# Patient Record
Sex: FEMALE | Race: OTHER | NOT HISPANIC OR LATINO | URBAN - METROPOLITAN AREA
[De-identification: names, ages, dates, MRNs, and addresses within clinical notes are randomized per-mention and may not be internally consistent; named-entity substitution may affect disease eponyms.]

---

## 2023-12-06 ENCOUNTER — OFFICE VISIT (OUTPATIENT)
Dept: PRIMARY CARE | Facility: CLINIC | Age: 33
End: 2023-12-06
Payer: COMMERCIAL

## 2023-12-06 VITALS
HEIGHT: 65 IN | RESPIRATION RATE: 15 BRPM | TEMPERATURE: 98.3 F | WEIGHT: 190 LBS | HEART RATE: 79 BPM | BODY MASS INDEX: 31.65 KG/M2 | OXYGEN SATURATION: 99 %

## 2023-12-06 DIAGNOSIS — F41.9 ANXIETY: ICD-10-CM

## 2023-12-06 DIAGNOSIS — K50.90 CROHN'S DISEASE WITHOUT COMPLICATION, UNSPECIFIED GASTROINTESTINAL TRACT LOCATION (CMS/HCC): ICD-10-CM

## 2023-12-06 DIAGNOSIS — Z00.00 ROUTINE GENERAL MEDICAL EXAMINATION AT A HEALTH CARE FACILITY: Primary | ICD-10-CM

## 2023-12-06 DIAGNOSIS — Z13.220 LIPID SCREENING: ICD-10-CM

## 2023-12-06 DIAGNOSIS — Z12.4 CERVICAL CANCER SCREENING: ICD-10-CM

## 2023-12-06 PROCEDURE — 3008F BODY MASS INDEX DOCD: CPT | Performed by: STUDENT IN AN ORGANIZED HEALTH CARE EDUCATION/TRAINING PROGRAM

## 2023-12-06 PROCEDURE — 99385 PREV VISIT NEW AGE 18-39: CPT | Performed by: STUDENT IN AN ORGANIZED HEALTH CARE EDUCATION/TRAINING PROGRAM

## 2023-12-06 RX ORDER — NEOMYCIN SULFATE 500 MG/1
TABLET ORAL
COMMUNITY
Start: 2023-11-16 | End: 2024-07-22 | Stop reason: ALTCHOICE

## 2023-12-06 RX ORDER — SERTRALINE HYDROCHLORIDE 100 MG/1
100 TABLET, FILM COATED ORAL DAILY
Qty: 90 TABLET | Refills: 3 | Status: SHIPPED | OUTPATIENT
Start: 2023-12-06

## 2023-12-06 RX ORDER — MESALAMINE 1000 MG/1
1000 SUPPOSITORY RECTAL
COMMUNITY
End: 2024-07-22 | Stop reason: ALTCHOICE

## 2023-12-06 RX ORDER — SERTRALINE HYDROCHLORIDE 100 MG/1
100 TABLET, FILM COATED ORAL DAILY
COMMUNITY
End: 2023-12-06 | Stop reason: SDUPTHER

## 2023-12-06 RX ORDER — ERYTHROMYCIN 500 MG/1
TABLET, COATED ORAL SEE ADMIN INSTRUCTIONS
COMMUNITY
Start: 2023-11-08 | End: 2024-07-22 | Stop reason: ALTCHOICE

## 2023-12-06 RX ORDER — MESALAMINE 400 MG/1
400 CAPSULE, DELAYED RELEASE ORAL DAILY
COMMUNITY
Start: 2023-09-06

## 2023-12-06 ASSESSMENT — PAIN SCALES - GENERAL: PAINLEVEL: 5

## 2023-12-06 ASSESSMENT — PATIENT HEALTH QUESTIONNAIRE - PHQ9
SUM OF ALL RESPONSES TO PHQ9 QUESTIONS 1 & 2: 3
SUM OF ALL RESPONSES TO PHQ QUESTIONS 1-9: 9

## 2023-12-06 NOTE — PROGRESS NOTES
[facility]     CHIEF COMPLAINT   New Patient  Previous PCP: Dr. Vanessa Chow, [facility], last seen >1yr ago     HISTORY OF PRESENT ILLNESS      This is a 33 y.o. female who presents to establish care.  Denies acute concerns today.    PAST MEDICAL AND SURGICAL HISTORY      Past Medical History:   Diagnosis Date   • Anxiety    • Crohn disease (CMS/HCC)        History reviewed. No pertinent surgical history.    MEDICATIONS        Current Outpatient Medications:   •  erythromycin base (E-MYCIN) 500 mg tablet, Take by mouth See admin instr., Disp: , Rfl:   •  mesalamine (CANASA) 1,000 mg suppository, Insert 1,000 mg into the rectum., Disp: , Rfl:   •  mesalamine (DELZICOL) 400 mg DR tablet, Take 400 mg by mouth daily., Disp: , Rfl:   •  neomycin (MYCIFRADIN) 500 mg tablet, TAKE 2 TABLETS (1,000 MG TOTAL) BY MOUTH EVERY 2 HOURS FOR 3 DOSES TAKE AS DIRECTED, Disp: , Rfl:   •  sertraline (ZOLOFT) 100 mg tablet, Take 1 tablet (100 mg total) by mouth daily., Disp: 90 tablet, Rfl: 3    ALLERGIES      Infliximab; Gadolinium-containing contrast media; Banana creme flavor; Kiwi; Latex, natural rubber; Pineapple; and Raspberry    FAMILY HISTORY      Family History   Problem Relation Age of Onset   • Heart attack Biological Father    • Hypertension Biological Father    • Cancer Paternal Grandmother        SOCIAL HISTORY      Social History     Socioeconomic History   • Marital status: Single     Spouse name: None   • Number of children: None   • Years of education: None   • Highest education level: None   Occupational History   • Occupation:    Tobacco Use   • Smoking status: Never     Passive exposure: Never   • Smokeless tobacco: Never   Vaping Use   • Vaping Use: Never used   Substance and Sexual Activity   • Alcohol use: Yes     Comment: Social   • Drug use: Never   • Sexual activity: Not Currently       REVIEW OF SYSTEMS      Review of Systems    PHYSICAL EXAMINATION      Visit  "Vitals  Pulse 79   Temp 36.8 °C (98.3 °F) (Temporal)   Resp 15   Ht 1.651 m (5' 5\")   Wt 86.2 kg (190 lb) Comment: with slippers on   SpO2 99%   BMI 31.62 kg/m²     Body mass index is 31.62 kg/m².    Physical Exam    LABS / IMAGING / STUDIES        Labs    No results found for: \"CREATININE\"  No results found for: \"WBC\", \"HGB\", \"HCT\", \"MCV\", \"PLT\"      No results found for: \"HGBA1C\"  No results found for: \"MICROALBUR\", \"BNQQ85INV\"        HEALTH MAINTENANCE           PAP Smear: Due, referred  Colonoscopy: 10/2023, next due ~2yr    Tdap: Believes UTD  Flu: Refuses AMA  COVID-19: Counseled    Labs: Due for lipid, ordered    Dentist: Overdue    Diet: Generally balanced  Exercise: Intermittent, walking dog       ASSESSMENT AND PLAN   Problem List Items Addressed This Visit        Immune    Crohn disease (CMS/HCC)     Stable, following regularly with GI.  Recommend ongoing GI evaluation and regular colonoscopic evaluation.            Mental Health    Anxiety     This condition is stable on current therapy.  Patient denies any symptoms or concerns associated with this condition.  Patient can continue current medical management and continue to follow with the appropriate specialist physician, if applicable.  Will continue to address and monitor at following appointments.           Relevant Medications    sertraline (ZOLOFT) 100 mg tablet       Other    Routine general medical examination at a health care facility - Primary     Patient is up-to-date with age-appropriate cancer screenings and vaccinations unless otherwise noted above.  Counseled regarding importance of keeping up-to-date with vaccinations and cancer screenings as applicable.  Baseline/repeat/annual labs ordered.  RTO 1 year or sooner PRN.          Other Visit Diagnoses     Cervical cancer screening        Relevant Orders    Ambulatory referral to Gynecology    Lipid screening        Relevant Orders    Lipid panel          Health Care Maintenance (discussed if " applicable):    Patient encouraged to increase activity gradually as tolerated with a goal of 150 minutes of aerobic activity per week via AVS.     Counseled patient on healthy eating (high quality, low carb, low sugar diet) via AVS.     Advised patient to complete regular dental examinations, brushing and flossing daily via AVS.    Encouraged enhanced safety by wearing seat belts, checking smoke and CO detectors via AVS.            Valente Sr, DO  12/06/23        Some or all of this note has been written using voice recognition software.    Please excuse any typographical errors.    On date of encounter, 30 minutes were spent exclusively dedicated to this encounter including pre-visit chart review and documentation, patient interview and physical examination, and post-visit documentation.

## 2023-12-06 NOTE — PATIENT INSTRUCTIONS
After-Visit Instructions and Tips for General Wellbeing      -Let us know over our Patient Portal ([employer]) if you have any further questions or concerns.         At your visit today, we determined the time for you to come back for your next visit.  This was based off of your medical history, any medical problems/symptoms discussed at time of visit, and any testing ordered.         If at any point between now and your next visit you to have any questions or concerns, please feel free to reach out to me via our Patient Portal, [employer].  Green Plug is free to use and I typically get back to you same-day, Monday through Friday.  Sometimes we are able to handle small questions or concerns over Green Plug, however if we cannot safely do so I will recommend that you come in for an appointment or get set up with a telemedicine appointment.      -Don't forget to get your blood work done.         Blood work is typically performed either through [facility], [facility], or [facility] laboratories.  This is dependent on your insurance, and during your visit we have discussed which lab is covered for you.  It will additionally state what lab you can go to on the hard copy of the lab slip that was provided to you after your visit.            Unless stated otherwise, these labs are FASTING.  You should not have anything to eat or drink besides water or black coffee (no cream or sugar) for at least 8 hours prior to getting your blood drawn.  Because of this, I usually recommend getting your blood work done first thing in the morning, as you have been fasting overnight.  Many people have the same idea however and these labs can get quite busy in the morning.         I recommend going online and setting up an appointment with the lab for an early morning just so that specific time spot is guaranteed for you and you are not waiting for an excess amount of time.  Make sure to bring the hard copy of your lab slip with you just in case they  cannot find you in their computer system.      As soon as I get all of your blood work results back, I will message you over [employer] or call you if the lab results are more pressing.      -Don't forget to get your Preventive Care screenings done.         If you were given any prescriptions for preventive care testing (such as mammograms, colonoscopies, Pap smears, or bone density testing), do not forget to get these done as well.  At your visit we discussed how to go about scheduling these, however if you still have questions you can reach out to me via [employer].         Additionally, all patients should be seen by their dentist for a routine dental cleaning at least once every 6 months.  Sometimes, dentists will bring you back sooner if they have to do more intensive cleaning.  Outside of the dentist, you should make sure you are brushing your teeth and flossing regularly.      -Dietary Tips         Every patient has individual dietary needs, however in general I recommend being cognizant of and limiting your intake of the following for food groups:    Salt/Sodium:       Salt can raise your blood pressure, which in turn can lead to increased risk of developing heart disease or strokes.  Please make sure you limit your use of sodium in cooking, and if you are buying a premade food item that has nutrition facts on the box/bag, please check for sodium content.  Many snacks such as chips or pretzels have a large amount of sodium in them.  Please do not exceed the daily recommended value of your sodium intake.    Sugars and Carbs:       Sugars (such as found in cake, candy, cookies, ice cream, etc.) and carbohydrates (such as found in bread, rice, pasta, starchy veggies such as potatoes, etc.) are both broken down your body into glucose.  Glucose is blood sugar, and excessive blood sugar exposure over the course of years to decades can put you at risk for developing diabetes.  Please make sure that you are limiting both  of these food groups in your diet, especially if you have a history of high blood sugar or strong family history of diabetes.    Fats:       Fatty foods such as fried/greasy foods, excess cooking oils, full fat dairy (whole milk, butter, cheese), and red meat all tend to be very fatty foods.  Fatty foods contain cholesterol, which can lead to heart disease in excessive amounts.  Please make sure you are limiting these foods in your diet.         Remember: The biggest thing on your plate should be responsibly cooked (sautéed in limited amounts of oil, air fried, or baked), responsibly seasoned (limited salt use), green vegetables (not potatoes).      -Exercise Tips         People exercise for multiple different reasons as each patient's exercise needs are unique.  In general, I recommend following the American Heart Association guidelines for heart healthy exercise.        The American Heart Association recommends doing 150 minutes (2.5 hours) of aerobic exercise per week.  You can break this up over the week however you see fit.  This exercise should be more strenuous than something you do on a daily basis, as such a light walk or counting your regular job as exercise do not count.  While these may be good from a calorie burning perspective, these are something your heart does on a daily basis and as such does not count as heart specific exercise.         Heart healthy exercise does not need to be particularly intensive.  Typically, a brisk walk up a hill (or on a treadmill that can incline) is more than enough.  The exercise should not be so intense that she could not focus on a podcast, TV show, or conversation during it.         If you have access to heart rate sensor such as through a smart watch, fitness tracker, or gym equipment that has this capability, a heart rate of about 140 is where most of my patients feel that they are able to maintain this exercise but do still derive cardiac benefit.      -Safety  Tips         Please make sure that you have both smoke detectors as well as carbon monoxide detectors in your home.  Additionally, please make sure that the batteries in these are up-to-date.  I recommend replacing batteries in both of these detectors every 6 months to ensure that the batteries do not run out and the smoke detectors/carbon monoxide detectors remain functional.         Additionally, please wear your seatbelt at all times when either operating a motor vehicle or as a passenger.  Regardless of location in the car you are seated, presence of airbags, or skill of the  of the vehicle, seatbelts are both required by law and greatly improve your chances of walking away from a car accident unscathed and should be worn at all times while the car is in motion.

## 2023-12-06 NOTE — ASSESSMENT & PLAN NOTE
This condition is stable on current therapy.  Patient denies any symptoms or concerns associated with this condition.  Patient can continue current medical management and continue to follow with the appropriate specialist physician, if applicable.  Will continue to address and monitor at following appointments.

## 2023-12-06 NOTE — ASSESSMENT & PLAN NOTE
Stable, following regularly with GI.  Recommend ongoing GI evaluation and regular colonoscopic evaluation.

## 2024-01-26 ENCOUNTER — APPOINTMENT (RX ONLY)
Dept: URBAN - METROPOLITAN AREA CLINIC 28 | Facility: CLINIC | Age: 34
Setting detail: DERMATOLOGY
End: 2024-01-26

## 2024-01-26 DIAGNOSIS — L663 OTHER SPECIFIED DISEASES OF HAIR AND HAIR FOLLICLES: ICD-10-CM | Status: INADEQUATELY CONTROLLED

## 2024-01-26 DIAGNOSIS — L70.0 ACNE VULGARIS: ICD-10-CM

## 2024-01-26 DIAGNOSIS — L65.8 OTHER SPECIFIED NONSCARRING HAIR LOSS: ICD-10-CM | Status: STABLE

## 2024-01-26 DIAGNOSIS — L73.9 FOLLICULAR DISORDER, UNSPECIFIED: ICD-10-CM | Status: INADEQUATELY CONTROLLED

## 2024-01-26 DIAGNOSIS — L738 OTHER SPECIFIED DISEASES OF HAIR AND HAIR FOLLICLES: ICD-10-CM | Status: INADEQUATELY CONTROLLED

## 2024-01-26 PROBLEM — L02.221 FURUNCLE OF ABDOMINAL WALL: Status: ACTIVE | Noted: 2024-01-26

## 2024-01-26 PROCEDURE — ? RECOMMENDATIONS

## 2024-01-26 PROCEDURE — ? ADDITIONAL NOTES

## 2024-01-26 PROCEDURE — ? DEFER

## 2024-01-26 PROCEDURE — 99204 OFFICE O/P NEW MOD 45 MIN: CPT

## 2024-01-26 PROCEDURE — ? COUNSELING

## 2024-01-26 PROCEDURE — ? PRESCRIPTION MEDICATION MANAGEMENT

## 2024-01-26 PROCEDURE — ? PRESCRIPTION

## 2024-01-26 RX ORDER — BENZOYL PEROXIDE 50 MG/ML
LIQUID TOPICAL QDAY
Qty: 237 | Refills: 3 | Status: ERX | COMMUNITY
Start: 2024-01-26

## 2024-01-26 RX ADMIN — BENZOYL PEROXIDE: 50 LIQUID TOPICAL at 00:00

## 2024-01-26 ASSESSMENT — LOCATION SIMPLE DESCRIPTION DERM
LOCATION SIMPLE: RIGHT SCALP
LOCATION SIMPLE: GROIN
LOCATION SIMPLE: LEFT CHEEK

## 2024-01-26 ASSESSMENT — LOCATION ZONE DERM
LOCATION ZONE: TRUNK
LOCATION ZONE: SCALP
LOCATION ZONE: FACE

## 2024-01-26 ASSESSMENT — LOCATION DETAILED DESCRIPTION DERM
LOCATION DETAILED: RIGHT MEDIAL FRONTAL SCALP
LOCATION DETAILED: LEFT SUPRAPUBIC SKIN
LOCATION DETAILED: LEFT INFERIOR CENTRAL MALAR CHEEK

## 2024-01-26 NOTE — HPI: PIMPLES (ACNE)
What Type Of Note Output Would You Prefer (Optional)?: Standard Output
How Severe Is Your Acne?: mild
Is This A New Presentation, Or A Follow-Up?: Acne
Females Only: When Was Your Last Menstrual Period?: 12/12/23

## 2024-01-26 NOTE — PROCEDURE: COUNSELING
Birth Control Pills Counseling: Birth Control Pill Counseling: I discussed with the patient the potential side effects of OCPs including but not limited to increased risk of stroke, heart attack, thrombophlebitis, deep venous thrombosis, hepatic adenomas, breast changes, GI upset, headaches, and depression.  The patient verbalized understanding of the proper use and possible adverse effects of OCPs. All of the patient's questions and concerns were addressed.
Topical Retinoid Pregnancy And Lactation Text: This medication is Pregnancy Category C. It is unknown if this medication is excreted in breast milk.
Tetracycline Counseling: Patient counseled regarding possible photosensitivity and increased risk for sunburn.  Patient instructed to avoid sunlight, if possible.  When exposed to sunlight, patients should wear protective clothing, sunglasses, and sunscreen.  The patient was instructed to call the office immediately if the following severe adverse effects occur:  hearing changes, easy bruising/bleeding, severe headache, or vision changes.  The patient verbalized understanding of the proper use and possible adverse effects of tetracycline.  All of the patient's questions and concerns were addressed. Patient understands to avoid pregnancy while on therapy due to potential birth defects.
Dapsone Counseling: I discussed with the patient the risks of dapsone including but not limited to hemolytic anemia, agranulocytosis, rashes, methemoglobinemia, kidney failure, peripheral neuropathy, headaches, GI upset, and liver toxicity.  Patients who start dapsone require monitoring including baseline LFTs and weekly CBCs for the first month, then every month thereafter.  The patient verbalized understanding of the proper use and possible adverse effects of dapsone.  All of the patient's questions and concerns were addressed.
Isotretinoin Pregnancy And Lactation Text: This medication is Pregnancy Category X and is considered extremely dangerous during pregnancy. It is unknown if it is excreted in breast milk.
Erythromycin Pregnancy And Lactation Text: This medication is Pregnancy Category B and is considered safe during pregnancy. It is also excreted in breast milk.
Bactrim Counseling:  I discussed with the patient the risks of sulfa antibiotics including but not limited to GI upset, allergic reaction, drug rash, diarrhea, dizziness, photosensitivity, and yeast infections.  Rarely, more serious reactions can occur including but not limited to aplastic anemia, agranulocytosis, methemoglobinemia, blood dyscrasias, liver or kidney failure, lung infiltrates or desquamative/blistering drug rashes.
Winlevi Counseling:  I discussed with the patient the risks of topical clascoterone including but not limited to erythema, scaling, itching, and stinging. Patient voiced their understanding.
Benzoyl Peroxide Pregnancy And Lactation Text: This medication is Pregnancy Category C. It is unknown if benzoyl peroxide is excreted in breast milk.
Spironolactone Counseling: Patient advised regarding risks of diarrhea, abdominal pain, hyperkalemia, birth defects (for female patients), liver toxicity and renal toxicity. The patient may need blood work to monitor liver and kidney function and potassium levels while on therapy. The patient verbalized understanding of the proper use and possible adverse effects of spironolactone.  All of the patient's questions and concerns were addressed.
Azithromycin Counseling:  I discussed with the patient the risks of azithromycin including but not limited to GI upset, allergic reaction, drug rash, diarrhea, and yeast infections.
Topical Sulfur Applications Counseling: Topical Sulfur Counseling: Patient counseled that this medication may cause skin irritation or allergic reactions.  In the event of skin irritation, the patient was advised to reduce the amount of the drug applied or use it less frequently.   The patient verbalized understanding of the proper use and possible adverse effects of topical sulfur application.  All of the patient's questions and concerns were addressed.
Azelaic Acid Pregnancy And Lactation Text: This medication is considered safe during pregnancy and breast feeding.
Sarecycline Counseling: Patient advised regarding possible photosensitivity and discoloration of the teeth, skin, lips, tongue and gums.  Patient instructed to avoid sunlight, if possible.  When exposed to sunlight, patients should wear protective clothing, sunglasses, and sunscreen.  The patient was instructed to call the office immediately if the following severe adverse effects occur:  hearing changes, easy bruising/bleeding, severe headache, or vision changes.  The patient verbalized understanding of the proper use and possible adverse effects of sarecycline.  All of the patient's questions and concerns were addressed.
Aklief Pregnancy And Lactation Text: It is unknown if this medication is safe to use during pregnancy.  It is unknown if this medication is excreted in breast milk.  Breastfeeding women should use the topical cream on the smallest area of the skin for the shortest time needed while breastfeeding.  Do not apply to nipple and areola.
Doxycycline Pregnancy And Lactation Text: This medication is Pregnancy Category D and not consider safe during pregnancy. It is also excreted in breast milk but is considered safe for shorter treatment courses.
Dapsone Pregnancy And Lactation Text: This medication is Pregnancy Category C and is not considered safe during pregnancy or breast feeding.
Detail Level: Detailed
Topical Clindamycin Counseling: Patient counseled that this medication may cause skin irritation or allergic reactions.  In the event of skin irritation, the patient was advised to reduce the amount of the drug applied or use it less frequently.   The patient verbalized understanding of the proper use and possible adverse effects of clindamycin.  All of the patient's questions and concerns were addressed.
Birth Control Pills Pregnancy And Lactation Text: This medication should be avoided if pregnant and for the first 30 days post-partum.
Tazorac Counseling:  Patient advised that medication is irritating and drying.  Patient may need to apply sparingly and wash off after an hour before eventually leaving it on overnight.  The patient verbalized understanding of the proper use and possible adverse effects of tazorac.  All of the patient's questions and concerns were addressed.
Minocycline Counseling: Patient advised regarding possible photosensitivity and discoloration of the teeth, skin, lips, tongue and gums.  Patient instructed to avoid sunlight, if possible.  When exposed to sunlight, patients should wear protective clothing, sunglasses, and sunscreen.  The patient was instructed to call the office immediately if the following severe adverse effects occur:  hearing changes, easy bruising/bleeding, severe headache, or vision changes.  The patient verbalized understanding of the proper use and possible adverse effects of minocycline.  All of the patient's questions and concerns were addressed.
Tetracycline Pregnancy And Lactation Text: This medication is Pregnancy Category D and not consider safe during pregnancy. It is also excreted in breast milk.
High Dose Vitamin A Counseling: Side effects reviewed, pt to contact office should one occur.
Use Enhanced Medication Counseling?: No
Spironolactone Pregnancy And Lactation Text: This medication can cause feminization of the male fetus and should be avoided during pregnancy. The active metabolite is also found in breast milk.
Isotretinoin Counseling: Patient should get monthly blood tests, not donate blood, not drive at night if vision affected, not share medication, and not undergo elective surgery for 6 months after tx completed. Side effects reviewed, pt to contact office should one occur.
Winlevi Pregnancy And Lactation Text: This medication is considered safe during pregnancy and breastfeeding.
Bactrim Pregnancy And Lactation Text: This medication is Pregnancy Category D and is known to cause fetal risk.  It is also excreted in breast milk.
Topical Sulfur Applications Pregnancy And Lactation Text: This medication is Pregnancy Category C and has an unknown safety profile during pregnancy. It is unknown if this topical medication is excreted in breast milk.
Topical Retinoid counseling:  Patient advised to apply a pea-sized amount only at bedtime and wait 30 minutes after washing their face before applying.  If too drying, patient may add a non-comedogenic moisturizer. The patient verbalized understanding of the proper use and possible adverse effects of retinoids.  All of the patient's questions and concerns were addressed.
Benzoyl Peroxide Counseling: Patient counseled that medicine may cause skin irritation and bleach clothing.  In the event of skin irritation, the patient was advised to reduce the amount of the drug applied or use it less frequently.   The patient verbalized understanding of the proper use and possible adverse effects of benzoyl peroxide.  All of the patient's questions and concerns were addressed.
Azithromycin Pregnancy And Lactation Text: This medication is considered safe during pregnancy and is also secreted in breast milk.
Erythromycin Counseling:  I discussed with the patient the risks of erythromycin including but not limited to GI upset, allergic reaction, drug rash, diarrhea, increase in liver enzymes, and yeast infections.
Doxycycline Counseling:  Patient counseled regarding possible photosensitivity and increased risk for sunburn.  Patient instructed to avoid sunlight, if possible.  When exposed to sunlight, patients should wear protective clothing, sunglasses, and sunscreen.  The patient was instructed to call the office immediately if the following severe adverse effects occur:  hearing changes, easy bruising/bleeding, severe headache, or vision changes.  The patient verbalized understanding of the proper use and possible adverse effects of doxycycline.  All of the patient's questions and concerns were addressed.
Topical Clindamycin Pregnancy And Lactation Text: This medication is Pregnancy Category B and is considered safe during pregnancy. It is unknown if it is excreted in breast milk.
Azelaic Acid Counseling: Patient counseled that medicine may cause skin irritation and to avoid applying near the eyes.  In the event of skin irritation, the patient was advised to reduce the amount of the drug applied or use it less frequently.   The patient verbalized understanding of the proper use and possible adverse effects of azelaic acid.  All of the patient's questions and concerns were addressed.
Tazorac Pregnancy And Lactation Text: This medication is not safe during pregnancy. It is unknown if this medication is excreted in breast milk.
Aklief counseling:  Patient advised to apply a pea-sized amount only at bedtime and wait 30 minutes after washing their face before applying.  If too drying, patient may add a non-comedogenic moisturizer.  The most commonly reported side effects including irritation, redness, scaling, dryness, stinging, burning, itching, and increased risk of sunburn.  The patient verbalized understanding of the proper use and possible adverse effects of retinoids.  All of the patient's questions and concerns were addressed.
High Dose Vitamin A Pregnancy And Lactation Text: High dose vitamin A therapy is contraindicated during pregnancy and breast feeding.
Detail Level: Zone

## 2024-01-26 NOTE — PROCEDURE: ADDITIONAL NOTES
Render Risk Assessment In Note?: yes
Detail Level: Zone
Additional Notes: Patient expressed concern over decreased diameter of individual hair strands - I discussed with her that I do not have a treatment for that.

## 2024-01-26 NOTE — PROCEDURE: RECOMMENDATIONS
Render Risk Assessment In Note?: no
Detail Level: Zone
Recommendations (Free Text): Hydrafacial and gentle skin care products - t/c topical retinoid after pt creates skin care regimen
Recommendation Preamble: The following recommendations were made during the visit:

## 2024-01-26 NOTE — PROCEDURE: DEFER
Detail Level: Zone
X Size Of Lesion In Cm (Optional): 0
Introduction Text (Please End With A Colon): The following procedure was deferred:
Procedure To Be Performed At Next Visit: Treatment
Reason To Defer Override: refer to 
Instructions (Optional): t/c after family planning

## 2024-07-22 ENCOUNTER — OFFICE VISIT (OUTPATIENT)
Dept: OBSTETRICS AND GYNECOLOGY | Facility: CLINIC | Age: 34
End: 2024-07-22
Payer: COMMERCIAL

## 2024-07-22 VITALS
HEIGHT: 65 IN | WEIGHT: 191 LBS | SYSTOLIC BLOOD PRESSURE: 130 MMHG | BODY MASS INDEX: 31.82 KG/M2 | DIASTOLIC BLOOD PRESSURE: 80 MMHG

## 2024-07-22 DIAGNOSIS — Z01.419 WELL WOMAN EXAM WITH ROUTINE GYNECOLOGICAL EXAM: Primary | ICD-10-CM

## 2024-07-22 PROCEDURE — 99385 PREV VISIT NEW AGE 18-39: CPT | Performed by: OBSTETRICS & GYNECOLOGY

## 2024-07-22 PROCEDURE — 3008F BODY MASS INDEX DOCD: CPT | Performed by: OBSTETRICS & GYNECOLOGY

## 2024-07-22 RX ORDER — MESALAMINE 1000 MG/1
1000 SUPPOSITORY RECTAL NIGHTLY PRN
COMMUNITY

## 2024-07-22 NOTE — PROGRESS NOTES
Visit Date: 07/22/2024  Aruna Jessica is 34 y.o. female presenting today for Annual GYN Exam ([place])    HPI:  Last Menstrual Period: Patient's last menstrual period was 07/18/2024 (exact date).  Menstrual Cycle: Menstrual cycle is Irregular. Approximate interval of 28-35 days. Bleeding lasts 6 days. Bleeding is Heavy then tapers. Patient has no bleeding <21 days. Patient does not have painful cramping during her periods.   Sexually Activity: has not been sexually active  Contraception: not needed  Bowel and Bladder function: Normal per patient.  Pap Smears:  hasn't had  Patient  does not report any breast issues.  Mammogram hx: n/a  Patient does have a family history of breast or gyn cancers. Mother with hx cervical cancer.  Patient  does not desire screening for STIs today.   Patient does not have a history of Domestic Violence/Verbal Abuse/Sexual Assault. She does feel safe in her current environment.     Past Medical History:  has a past medical history of Anxiety, Crohn disease (CMS/HCC), Depression, GERD (gastroesophageal reflux disease), Irritable bowel syndrome, and Mucocele, appendix.    Past Surgical History:  has a past surgical history that includes Laparoscopic appendectomy (12/2023).    Medications:   Current Outpatient Medications:     mesalamine (CANASA) 1,000 mg suppository, Insert 1,000 mg into the rectum nightly as needed., Disp: , Rfl:     mesalamine (DELZICOL) 400 mg DR tablet, Take 400 mg by mouth daily., Disp: , Rfl:     sertraline (ZOLOFT) 100 mg tablet, Take 1 tablet (100 mg total) by mouth daily., Disp: 90 tablet, Rfl: 3      Allergies: is allergic to infliximab; gadolinium-containing contrast media; banana creme flavor; kiwi; latex, natural rubber; pineapple; and raspberry.     Family History: family history includes Cancer in her paternal grandmother; Cervical cancer (age of onset: 40) in her biological mother; Heart attack in her biological father; Hypertension in her biological  "father.    Social History:   Social History     Tobacco Use    Smoking status: Never     Passive exposure: Never    Smokeless tobacco: Never   Vaping Use    Vaping Use: Never used   Substance Use Topics    Alcohol use: Yes     Comment: Social    Drug use: Never       Review of Systems  Constitutional:   No hot flashes.   No night sweats.   No unexpected weight changes.  HENT:   No oral ulcers.   No headaches related to menstrual cycle.   Breasts:   No changes to skin of the breast or nipple.   No nipple discharge.   No breast lumps/cysts.   No breast pain.   Patient has not noticed any changes to breasts.   Respiratory:   No shortness of breath.  Cardiovascular:   No chest pain  Gastrointestinal:   No changes in bowel habits.  Genitourinary:   No pain with urination.   No urgency with urination.   No increased frequency of urination.   No urinary incontinence.   No vaginal dryness.  No vaginal discharge.   No painful intercourse.   No genital sores.   + irregular menstrual cycles.   No heavy menstrual cycles.   No painful menstrual periods.   No bleeding between menstrual cycles.   No bleeding after intercourse.  No absence of menstrual periods.  Integument:   No changes to any existing genital skin lesions or moles.   No new vaginal skin lesions or moles.   No genital rashes.   Endocrine:   No increased hair growth   Psychiatric:   No anxiety.   No depression.   No suicidal ideation.  Patient does not have concerns for her safety.   Heme-Lymph:   No easy bruising.   No unexplained lumps.         Visit Vitals  /80   Ht 1.651 m (5' 5\")   Wt 86.6 kg (191 lb)   LMP 07/18/2024 (Exact Date)   BMI 31.78 kg/m²       OBGyn Exam  General Appearance: Alert, cooperative, no acute distress  Head: Normocephalic, without obvious abnormality  Breast: Right breast normal without mass, skin or nipple changes or axillary nodes. Left breast normal without mass, skin or nipple changes or axillary nodes.  Abdomen: Soft, nontender, " nondistended,no masses, no organomegaly  Pelvic exam: VULVA: normal appearing vulva with no masses, tenderness or lesions. VAGINA: normal appearing vagina with normal color and discharge, no lesions. CERVIX: limited by patient discomfort, anterior portion visualized and pap quickly collected, posteriorly no visualized. On bimanual smooth and normal palpating. UTERUS: uterus is normal size, shape, consistency and non-tender. ADNEXA: normal adnexa in size, nontender and no masses.  Extremities: no edema     exam conducted with chaperone present.     Assessment and Plan:  34 y.o. yo presents for an annual exam.   1. Pap  with HPV collected today. Exam somewhat limited by discomfort, discussed that if insufficient we would bring her back.  2. Contraception: declines  3. STI Testing: GC/CT declined. HIV/SA/Hep B declined.  4. Mammogram: n/a  5. Return to office 1 year or prn. Patient to call for results in 7-10 days.     Daisy Betancourt, DO

## 2024-07-30 LAB
CLINICAL INFO: NORMAL
CYTO CVX: NORMAL
CYTOLOGIST CVX/VAG CYTO: NORMAL
CYTOLOGIST CVX/VAG CYTO: NORMAL
CYTOLOGY CMNT CVX/VAG CYTO-IMP: NORMAL
DATE OF PREVIOUS PAP: NORMAL
DATE PREVIOUS BX: NORMAL
HPV E6+E7 MRNA CVX QL NAA+PROBE: NOT DETECTED
LMP START DATE: NORMAL
QUEST COMMENT (PAP): NORMAL
SPECIMEN SOURCE CVX/VAG CYTO: NORMAL
STAT OF ADQ CVX/VAG CYTO-IMP: NORMAL

## 2024-08-14 ENCOUNTER — TELEPHONE (OUTPATIENT)
Dept: OBSTETRICS AND GYNECOLOGY | Facility: CLINIC | Age: 34
End: 2024-08-14
Payer: COMMERCIAL

## 2024-08-14 NOTE — TELEPHONE ENCOUNTER
Spoke with pt re: pap results. Pt is aware that she needs to repeat pap within the next few months. States she would like to call us back to schedule after she looks at her schedule.

## 2024-08-14 NOTE — TELEPHONE ENCOUNTER
----- Message from Daisy Betancourt DO sent at 7/30/2024 10:13 AM EDT -----  Can someone let mohit know the pap was inadequate, we need to repeat in the office at some point in the next few months when she is ready.